# Patient Record
Sex: MALE | Race: WHITE | NOT HISPANIC OR LATINO | Employment: OTHER | URBAN - METROPOLITAN AREA
[De-identification: names, ages, dates, MRNs, and addresses within clinical notes are randomized per-mention and may not be internally consistent; named-entity substitution may affect disease eponyms.]

---

## 2022-04-11 ENCOUNTER — VIRTUAL VISIT (OUTPATIENT)
Dept: FAMILY MEDICINE | Facility: CLINIC | Age: 72
End: 2022-04-11
Payer: MEDICARE

## 2022-04-11 ENCOUNTER — LAB (OUTPATIENT)
Dept: LAB | Facility: CLINIC | Age: 72
End: 2022-04-11
Payer: MEDICARE

## 2022-04-11 DIAGNOSIS — E53.8 VITAMIN B12 DEFICIENCY (NON ANEMIC): ICD-10-CM

## 2022-04-11 DIAGNOSIS — U07.1 INFECTION DUE TO 2019 NOVEL CORONAVIRUS: ICD-10-CM

## 2022-04-11 DIAGNOSIS — U07.1 CLINICAL DIAGNOSIS OF COVID-19: Primary | ICD-10-CM

## 2022-04-11 DIAGNOSIS — I48.91 ATRIAL FIBRILLATION, UNSPECIFIED TYPE (H): ICD-10-CM

## 2022-04-11 LAB
ANION GAP SERPL CALCULATED.3IONS-SCNC: 5 MMOL/L (ref 3–14)
BUN SERPL-MCNC: 11 MG/DL (ref 7–30)
CALCIUM SERPL-MCNC: 8.9 MG/DL (ref 8.5–10.1)
CHLORIDE BLD-SCNC: 104 MMOL/L (ref 94–109)
CO2 SERPL-SCNC: 26 MMOL/L (ref 20–32)
CREAT SERPL-MCNC: 0.98 MG/DL (ref 0.66–1.25)
GFR SERPL CREATININE-BSD FRML MDRD: 83 ML/MIN/1.73M2
GLUCOSE BLD-MCNC: 108 MG/DL (ref 70–99)
POTASSIUM BLD-SCNC: 3.9 MMOL/L (ref 3.4–5.3)
SODIUM SERPL-SCNC: 135 MMOL/L (ref 133–144)

## 2022-04-11 PROCEDURE — 80048 BASIC METABOLIC PNL TOTAL CA: CPT

## 2022-04-11 PROCEDURE — 99203 OFFICE O/P NEW LOW 30 MIN: CPT | Mod: 95 | Performed by: NURSE PRACTITIONER

## 2022-04-11 PROCEDURE — 36415 COLL VENOUS BLD VENIPUNCTURE: CPT

## 2022-04-11 RX ORDER — FEXOFENADINE HCL 180 MG/1
180 TABLET ORAL DAILY
COMMUNITY

## 2022-04-11 RX ORDER — ATORVASTATIN CALCIUM 10 MG/1
TABLET, FILM COATED ORAL
COMMUNITY
Start: 2019-03-15

## 2022-04-11 RX ORDER — LANOLIN ALCOHOL/MO/W.PET/CERES
1000 CREAM (GRAM) TOPICAL DAILY
COMMUNITY
Start: 2022-04-11

## 2022-04-11 RX ORDER — VIT C/B6/B5/MAGNESIUM/HERB 173 50-5-6-5MG
CAPSULE ORAL DAILY
COMMUNITY
Start: 2022-04-11

## 2022-04-11 RX ORDER — MULTIVITAMIN WITH IRON
1 TABLET ORAL DAILY
COMMUNITY
Start: 2022-04-11

## 2022-04-11 NOTE — RESULT ENCOUNTER NOTE
Dear Roberto,    Here is a summary of your recent test results:    Kidney function is normal (Cr, GFR), Sodium is normal, Potassium is normal, Calcium is normal, Glucose is slightly elevated but not fasting so it is normal.     Thank you for choosing [facility].  It was an honor and a privilege to participate in your care.     Healthy regards,    Wandy Collins, MARISP  [facility]

## 2022-04-11 NOTE — LETTER
[facility]  [address] [place] [address]  [phone]                    April 18, 2022    Roberto Jhaveri  [address] [id number]      Dear Roberto,    Here is a summary of your recent test results:    Here is a summary of your recent test results: we were unable to get a hold of you on your mychart.       Kidney function is normal (Cr, GFR), Sodium is normal, Potassium is normal, Calcium is normal, Glucose is slightly elevated but not fasting so it is normal.     Your test results are enclosed.      Please contact me if you have any questions.    In addition, here is a list of due or overdue Health Maintenance reminders.    Health Maintenance Due   Topic Date Due     ANNUAL REVIEW OF HM ORDERS  Never done     Discuss Advance Care Planning  Never done     Colorectal Cancer Screening  Never done     Hepatitis C Screening  Never done     Cholesterol Lab  Never done     Annual Wellness Visit  Never done     FALL RISK ASSESSMENT  Never done     AORTIC ANEURYSM SCREENING (SYSTEM ASSIGNED)  Never done     Zoster (Shingles) Vaccine (3 of 3) 04/17/2020     PHQ-2 (once per calendar year)  Never done       Please call us at [phone] (or use [place]) to address the above recommendations.            Thank you very much for trusting [facility].     Healthy regards,      Wandy Collins, [facility]         Results for orders placed or performed in visit on 04/11/22   Basic metabolic panel  (Ca, Cl, CO2, Creat, Gluc, K, Na, BUN)     Status: Abnormal   Result Value Ref Range    Sodium 135 133 - 144 mmol/L    Potassium 3.9 3.4 - 5.3 mmol/L    Chloride 104 94 - 109 mmol/L    Carbon Dioxide (CO2) 26 20 - 32 mmol/L    Anion Gap 5 3 - 14 mmol/L    Urea Nitrogen 11 7 - 30 mg/dL    Creatinine 0.98 0.66 - 1.25 mg/dL    Calcium 8.9 8.5 - 10.1 mg/dL    Glucose 108 (H) 70 - 99 mg/dL    GFR Estimate 83 >60 mL/min/1.73m2

## 2022-04-11 NOTE — PROGRESS NOTES
Called pharmacy @ 415pm [phone] pharmacy estimated that the Paxlovid medication should be filled in 30-40 minutes, pharmacy will also call [phone] when ready.     Called patient 420pm and informed him of the direction from Wandy Collins:  stop Atorvastatin X 5 days; decrease Eliquis to 2.5 mg BID X 5 days; stop all vitamin supplements x 5 days.-patient wrote directions down and verbalized understanding.     Patient is aware where the pharmacy is located reiterated it is in [facility] and closes at 6 pm today.     informed him that pharmacy estimated the medication will be complete around 5pm but the pharmacy will call when ready. Per patient,wife is going to the pharmacy around 5pm to  the medication even if she has to wait     patient denied questions and agreed to plan.  informed patient to call the clinic with any further questions or concerns.     Deb SINGH RN   [facility] Triage

## 2022-04-11 NOTE — PROGRESS NOTES
Writer called [facility] lab 0913 am [phone]     Outpatient/future BMP order is in Epic- BMP needs to be drawn today to start Covid treatment     booked [facility] @ 140 pm today-appointment scheduled results should be back in 30-40 minutes.   Can do walk in but will have to wait      Called patient @ 928 am and informed him of [facility] lab - time 140pm today-wear a mask    Patient verbalized understanding and agreed to plan.     Deb SINGH RN   [facility] Triage

## 2022-04-11 NOTE — PROGRESS NOTES
Roberto is a 72 year old who is being evaluated via a billable video visit.      How would you like to obtain your AVS? MyChart  If the video visit is dropped, the invitation should be resent by: text -- [phone]  Will anyone else be joining your video visit? No    Video Start Time: 8:26 AM    Assessment & Plan     Clinical diagnosis of COVID-19  Infection due to 2019 novel coronavirus  Qualifies for treatment. Needs to start today.   Kidney function done today stat.   Prescription sent to [facility].  Written education provided.   Med instructions YES stop Atorvastatin X 5 days; decrease Eliquis to 2.5 mg BID X 5 days; stop all vitamin supplements x 5 days.   Red flag symptoms discussed and if these occur present to the emergency room or call 911.  Roberto verbalizes understanding of plan of care and is in agreement.   - Basic metabolic panel  (Ca, Cl, CO2, Creat, Gluc, K, Na, BUN)    Vitamin B12 deficiency (non anemic)    - cyanocobalamin (VITAMIN B-12) 1000 MCG tablet    Atrial fibrillation, unspecified type (H)    - apixaban ANTICOAGULANT (ELIQUIS) 5 MG tablet      Return in about 2 weeks (around 4/25/2022), or if symptoms worsen or fail to improve, for Recheck.      ANDREA Ribeiro [facility]   Roberto is a 72 year old who presents for the following health issues     HPI     COVID-19 Symptom Review    How many days ago did these symptoms start? Thursday April 7th test + April 9th at home;   Today is Day 5  positive home test - going in to get test today at [facility] Been quarantining for last 2 days    Are any of the following symptoms significant for you?    New or worsening difficulty breathing? No    Worsening cough? Yes, it's a dry cough.     Fever or chills? No    Headache: YES- sinus pressure - does have allergies    Sore throat: no    Chest pain: no    Diarrhea: no    Body aches? no    What treatments has patient tried? Advil, sinus nasal   spray - clears out for a while  Does patient live in a nursing home, group home, or shelter? no  Does patient have a way to get food/medications during quarantined? Yes, I have a friend or family member who can help me.    MASSBP Score 4/11/2022   Age Greater than or equal to 65 years 2   BMI greater than or equal to 35 kg/m2 2   Has Diabetes Mellitus 0   Has Chronic Kidney Disease 0   Has Cardiovascular Disease and 55 years or older 2   Has Chronic Respiratory Disease and 55 years or older 0   Has Hypertension and 55 years or older 0   Is Immunocompromised 0   Is Pregnant 0   Member of BIPOC community (Black/, /, ,  or , or  or [place] Native)  0   MASSBP Score 6   Has the patient had a positive COVID test outside our system?  Yes   What day did symptoms start?  4/7/2022     No recent kidney function tested.   Last kidney function completed at [facility] on 3-2-21 with within normal limits.  Needs labs today.  Elquis for Afib.   Friday second booster.      MASSBP Score 4/10/2022   What day did symptoms start?  4/7/2022     Review of Systems   Constitutional, HEENT, cardiovascular, pulmonary, GI, , musculoskeletal, neuro, skin, endocrine and psych systems are negative, except as otherwise noted in the HPI.      Objective           Vitals:  No vitals were obtained today due to virtual visit.    Physical Exam   GENERAL: Healthy, alert and no distress  EYES: Eyes grossly normal to inspection.  No discharge or erythema, or obvious scleral/conjunctival abnormalities.  RESP: No audible wheeze, cough, or visible cyanosis.  No visible retractions or increased work of breathing.    SKIN: Visible skin clear. No significant rash, abnormal pigmentation or lesions.  NEURO: Cranial nerves grossly intact.  Mentation and speech appropriate for age.  PSYCH: Mentation appears normal, affect normal/bright, judgement and insight intact, normal speech  and appearance well-groomed.    Results for orders placed or performed in visit on 04/11/22   Basic metabolic panel  (Ca, Cl, CO2, Creat, Gluc, K, Na, BUN)     Status: Abnormal   Result Value Ref Range    Sodium 135 133 - 144 mmol/L    Potassium 3.9 3.4 - 5.3 mmol/L    Chloride 104 94 - 109 mmol/L    Carbon Dioxide (CO2) 26 20 - 32 mmol/L    Anion Gap 5 3 - 14 mmol/L    Urea Nitrogen 11 7 - 30 mg/dL    Creatinine 0.98 0.66 - 1.25 mg/dL    Calcium 8.9 8.5 - 10.1 mg/dL    Glucose 108 (H) 70 - 99 mg/dL    GFR Estimate 83 >60 mL/min/1.73m2           Video-Visit Details    Type of service:  Video Visit    Video End Time:8:57 AM    Originating Location (pt. Location): Home    Distant Location (provider location):  [facility]     Platform used for Video Visit: [id number]

## 2022-04-17 NOTE — RESULT ENCOUNTER NOTE
Note to Staff: please send a result letter      Dear Roberto,     Here is a summary of your recent test results: we were unable to get a hold of you on your mychart.      Kidney function is normal (Cr, GFR), Sodium is normal, Potassium is normal, Calcium is normal, Glucose is slightly elevated but not fasting so it is normal.      Thank you for choosing [facility].  It was an honor and a privilege to participate in your care.      Healthy regards,     Wandy Collins, EVIN  [facility]

## 2022-04-30 ENCOUNTER — HEALTH MAINTENANCE LETTER (OUTPATIENT)
Age: 72
End: 2022-04-30

## 2022-11-20 ENCOUNTER — HEALTH MAINTENANCE LETTER (OUTPATIENT)
Age: 72
End: 2022-11-20

## 2023-06-02 ENCOUNTER — HEALTH MAINTENANCE LETTER (OUTPATIENT)
Age: 73
End: 2023-06-02

## 2024-06-22 ENCOUNTER — HEALTH MAINTENANCE LETTER (OUTPATIENT)
Age: 74
End: 2024-06-22